# Patient Record
Sex: MALE | Race: WHITE | URBAN - METROPOLITAN AREA
[De-identification: names, ages, dates, MRNs, and addresses within clinical notes are randomized per-mention and may not be internally consistent; named-entity substitution may affect disease eponyms.]

---

## 2023-11-01 ENCOUNTER — TELEPHONE (OUTPATIENT)
Age: 68
End: 2023-11-01

## 2023-11-01 NOTE — TELEPHONE ENCOUNTER
Patient called to Cxl his apt with Dr. Lewis Alanis on 1/03/24 for MOHS Recon [facility] right [facility]. he is going with radiation instead of MOHS so he will not need plastics. I contacted Cornelius Cerna to let her know.